# Patient Record
Sex: FEMALE | Race: WHITE | NOT HISPANIC OR LATINO | Employment: OTHER | URBAN - METROPOLITAN AREA
[De-identification: names, ages, dates, MRNs, and addresses within clinical notes are randomized per-mention and may not be internally consistent; named-entity substitution may affect disease eponyms.]

---

## 2022-09-13 ENCOUNTER — OFFICE VISIT (OUTPATIENT)
Dept: INTERNAL MEDICINE | Facility: CLINIC | Age: 62
End: 2022-09-13
Payer: COMMERCIAL

## 2022-09-13 VITALS
SYSTOLIC BLOOD PRESSURE: 118 MMHG | HEIGHT: 69 IN | HEART RATE: 95 BPM | WEIGHT: 136 LBS | DIASTOLIC BLOOD PRESSURE: 64 MMHG | BODY MASS INDEX: 20.14 KG/M2 | OXYGEN SATURATION: 98 %

## 2022-09-13 DIAGNOSIS — B02.29 POSTHERPETIC NEURALGIA: Primary | ICD-10-CM

## 2022-09-13 PROCEDURE — 99203 OFFICE O/P NEW LOW 30 MIN: CPT | Performed by: INTERNAL MEDICINE

## 2022-09-13 RX ORDER — CAPSAICIN 0.025 %
CREAM (GRAM) TOPICAL
Qty: 120 G | Refills: 1 | Status: SHIPPED | OUTPATIENT
Start: 2022-09-13

## 2022-09-13 RX ORDER — GABAPENTIN 300 MG/1
300 CAPSULE ORAL 3 TIMES DAILY
Qty: 90 CAPSULE | Refills: 1 | Status: SHIPPED | OUTPATIENT
Start: 2022-09-13 | End: 2022-09-21

## 2022-09-13 NOTE — PROGRESS NOTES
"      Chica Gaines   62 year old female    Date of Visit: 9/13/2022    Chief Complaint   Patient presents with     Shingles     Right side shingles, had since Aug 19, finished anti-viral, pain is still pretty bad and constant, getting worse     Subjective  Chica is an otherwise healthy 62-year-old female who moved from [place] and does not have a doctor here.    He developed severe right abdominal shingles on August 19 and was treated with antivirals through an urgent care.  She completed the antiviral.  The lesions have all healed.  But the burning pain has persisted.  Even the touch of her close causes significant discomfort in that area.  She describes it as a burning feeling.  It is present most of the time, waxing and waning.  She has not tried any pain medication.    No fevers.  No new cough.  No abdominal pain deeper or change in bowels.    She has not another medication.    She is thin, active.  She is a vegan.    PMHx:  No past medical history on file.  PSHx:  No past surgical history on file.  Immunizations:   There is no immunization history on file for this patient.    ROS A comprehensive review of systems was performed and was otherwise negative    Medications, allergies, and problem list were reviewed and updated    Exam  /64 (BP Location: Right arm, Patient Position: Sitting, Cuff Size: Adult Regular)   Pulse 95   Ht 1.74 m (5' 8.5\")   Wt 61.7 kg (136 lb)   SpO2 98%   BMI 20.38 kg/m    Healthy-appearing female.  No conjunctivitis.  Heart is regular without murmur.  Abdomen is thin and soft.  She does have hyperpigmented scarring in a dermatomal distribution on the right approximately T10 or T11 dermatome, with no open lesions or erythema.    Assessment/Plan  1. Postherpetic neuralgia  Moderately severe pain consistent postherpetic neuralgia after severe case of shingles, now resolved after antiviral.    She was warned about sedation risk with gabapentin.  We discussed increasing dose if " needed.    I recommended follow-up with her primary care provider.    I did discuss the chance of chronic postherpetic neuralgia.  - gabapentin (NEURONTIN) 300 MG capsule; Take 1 capsule (300 mg) by mouth 3 times daily For nerve pain  Dispense: 90 capsule; Refill: 1  - capsaicin (ZOSTRIX) 0.025 % external cream; Apply small amount to the area of the postherpetic neuralgia up to 3 times a day to give temporary relief from pain.  Do not get in face or eyes.  Dispense: 120 g; Refill: 1      Return in about 4 weeks (around 10/11/2022) for Establish care.   Patient Instructions   Postherpetic neuralgia usually slowly fades over the period of weeks to months.  Although sometimes it can be persistent.    Use gabapentin to lessen the pain nerve single from the injured nerve.  Start with 300 mg 3 times a day of gabapentin.  You can increase dose to 600 mg if needed and you tolerate the lower dose.  Gabapentin can cause some sleepiness.    You can use external Zostrix cream.    I would recommend you establish care with a primary care provider before the end of the year.    Art Langley MD, MD        Current Outpatient Medications   Medication Sig Dispense Refill     capsaicin (ZOSTRIX) 0.025 % external cream Apply small amount to the area of the postherpetic neuralgia up to 3 times a day to give temporary relief from pain.  Do not get in face or eyes. 120 g 1     gabapentin (NEURONTIN) 300 MG capsule Take 1 capsule (300 mg) by mouth 3 times daily For nerve pain 90 capsule 1     Allergies   Allergen Reactions     Sulfa Drugs Rash     Social History     Tobacco Use     Smoking status: Former Smoker     Smokeless tobacco: Never Used   Vaping Use     Vaping Use: Never used             Subjective   Chica is a 62 year old, presenting for the following health issues:  Shingles (Right side shingles, had since Aug 19, finished anti-viral, pain is still pretty bad and constant, getting worse)      History of Present Illness  "      Reason for visit:  Shingles  Symptom onset:  3-4 weeks ago  Symptoms include:  Right sided pain  Symptom intensity:  Severe  Symptom progression:  Worsening  Had these symptoms before:  Yes  Has tried/received treatment for these symptoms:  No  What makes it worse:  Standing  What makes it better:  No    She eats 4 or more servings of fruits and vegetables daily.She consumes 0 sweetened beverage(s) daily.She exercises with enough effort to increase her heart rate 30 to 60 minutes per day.  She exercises with enough effort to increase her heart rate 5 days per week.   She is taking medications regularly.             Review of Systems         Objective    /64 (BP Location: Right arm, Patient Position: Sitting, Cuff Size: Adult Regular)   Pulse 95   Ht 1.74 m (5' 8.5\")   Wt 61.7 kg (136 lb)   SpO2 98%   BMI 20.38 kg/m    Body mass index is 20.38 kg/m .  Physical Exam                       "

## 2022-09-13 NOTE — PATIENT INSTRUCTIONS
Postherpetic neuralgia usually slowly fades over the period of weeks to months.  Although sometimes it can be persistent.    Use gabapentin to lessen the pain nerve single from the injured nerve.  Start with 300 mg 3 times a day of gabapentin.  You can increase dose to 600 mg if needed and you tolerate the lower dose.  Gabapentin can cause some sleepiness.    You can use external Zostrix cream.    I would recommend you establish care with a primary care provider before the end of the year.

## 2022-09-19 ENCOUNTER — APPOINTMENT (OUTPATIENT)
Dept: INTERNAL MEDICINE | Facility: CLINIC | Age: 62
End: 2022-09-19
Payer: COMMERCIAL

## 2022-09-21 ENCOUNTER — TELEPHONE (OUTPATIENT)
Dept: INTERNAL MEDICINE | Facility: CLINIC | Age: 62
End: 2022-09-21

## 2022-09-21 DIAGNOSIS — B02.29 POSTHERPETIC NEURALGIA: ICD-10-CM

## 2022-09-21 RX ORDER — GABAPENTIN 300 MG/1
CAPSULE ORAL
Qty: 90 CAPSULE | Refills: 1 | Status: SHIPPED | OUTPATIENT
Start: 2022-09-21 | End: 2022-10-18

## 2022-10-19 ENCOUNTER — MYC REFILL (OUTPATIENT)
Dept: INTERNAL MEDICINE | Facility: CLINIC | Age: 62
End: 2022-10-19

## 2022-10-19 DIAGNOSIS — B02.29 POSTHERPETIC NEURALGIA: ICD-10-CM

## 2022-10-19 RX ORDER — GABAPENTIN 300 MG/1
CAPSULE ORAL
Qty: 90 CAPSULE | Refills: 0 | OUTPATIENT
Start: 2022-10-19

## 2023-01-08 ENCOUNTER — HEALTH MAINTENANCE LETTER (OUTPATIENT)
Age: 63
End: 2023-01-08

## 2024-02-10 ENCOUNTER — HEALTH MAINTENANCE LETTER (OUTPATIENT)
Age: 64
End: 2024-02-10

## 2024-06-27 ENCOUNTER — PATIENT OUTREACH (OUTPATIENT)
Dept: INTERNAL MEDICINE | Facility: CLINIC | Age: 64
End: 2024-06-27
Payer: COMMERCIAL

## 2024-06-28 ENCOUNTER — PATIENT OUTREACH (OUTPATIENT)
Dept: INTERNAL MEDICINE | Facility: CLINIC | Age: 64
End: 2024-06-28
Payer: COMMERCIAL

## 2024-06-28 NOTE — LETTER
June 28, 2024      Chica Gaines  [id number] [facility] [address]      Your healthcare team cares about your health. To provide you with the best care, we have reviewed your chart and based on our findings, we see that you are due to:     PREVENTATIVE VISIT: Annual Medicare Wellness:Schedule an Annual Medicare Wellness Exam. Please call your [facility] to set up your appointment.    If you have already completed these items, please contact the clinic via phone or Mychart so your care team can review and update your records.  Thank you for choosing [facility] for your healthcare needs. For any questions, concerns, or to schedule an appointment please contact the clinic.     Healthy Regards,    Your [facility] Care Team

## 2024-06-28 NOTE — TELEPHONE ENCOUNTER
Patient Quality Outreach    Patient is due for the following:   Physical Annual Wellness Visit    Next Steps:   Schedule a Annual Wellness Visit    Type of outreach:    Sent [employer] message.      Questions for provider review:    None           Lauren Ramirez MA

## 2024-06-29 ENCOUNTER — HEALTH MAINTENANCE LETTER (OUTPATIENT)
Age: 64
End: 2024-06-29

## 2025-02-08 ENCOUNTER — HEALTH MAINTENANCE LETTER (OUTPATIENT)
Age: 65
End: 2025-02-08

## 2025-07-13 ENCOUNTER — HEALTH MAINTENANCE LETTER (OUTPATIENT)
Age: 65
End: 2025-07-13